# Patient Record
Sex: FEMALE | Race: WHITE | URBAN - METROPOLITAN AREA
[De-identification: names, ages, dates, MRNs, and addresses within clinical notes are randomized per-mention and may not be internally consistent; named-entity substitution may affect disease eponyms.]

---

## 2018-02-21 ENCOUNTER — HISTORICAL (OUTPATIENT)
Dept: ADMINISTRATIVE | Facility: HOSPITAL | Age: 33
End: 2018-02-21

## 2018-02-23 LAB — FINAL CULTURE: NO GROWTH

## 2018-05-29 ENCOUNTER — HISTORICAL (OUTPATIENT)
Dept: ADMINISTRATIVE | Facility: HOSPITAL | Age: 33
End: 2018-05-29

## 2018-06-01 ENCOUNTER — HISTORICAL (OUTPATIENT)
Dept: ADMINISTRATIVE | Facility: HOSPITAL | Age: 33
End: 2018-06-01

## 2018-07-11 ENCOUNTER — HISTORICAL (OUTPATIENT)
Dept: LAB | Facility: HOSPITAL | Age: 33
End: 2018-07-11

## 2018-07-13 LAB — FINAL CULTURE: NORMAL

## 2019-06-17 LAB
B-HCG SERPL QL: NEGATIVE
ERYTHROCYTE [DISTWIDTH] IN BLOOD BY AUTOMATED COUNT: 13.3 % (ref 11.5–17)
HCT VFR BLD AUTO: 38.4 % (ref 37–47)
HGB BLD-MCNC: 12.3 GM/DL (ref 12–16)
MCH RBC QN AUTO: 27.6 PG (ref 27–31)
MCHC RBC AUTO-ENTMCNC: 32 GM/DL (ref 33–36)
MCV RBC AUTO: 86.3 FL (ref 80–94)
PLATELET # BLD AUTO: 249 X10(3)/MCL (ref 130–400)
PMV BLD AUTO: 9.5 FL (ref 9.4–12.4)
RBC # BLD AUTO: 4.45 X10(6)/MCL (ref 4.2–5.4)
WBC # SPEC AUTO: 5.3 X10(3)/MCL (ref 4.5–11.5)

## 2019-06-18 ENCOUNTER — HISTORICAL (OUTPATIENT)
Dept: ADMINISTRATIVE | Facility: HOSPITAL | Age: 34
End: 2019-06-18

## 2022-04-07 ENCOUNTER — HISTORICAL (OUTPATIENT)
Dept: ADMINISTRATIVE | Facility: HOSPITAL | Age: 37
End: 2022-04-07

## 2022-04-24 VITALS
BODY MASS INDEX: 26.29 KG/M2 | SYSTOLIC BLOOD PRESSURE: 129 MMHG | WEIGHT: 163.56 LBS | HEIGHT: 66 IN | OXYGEN SATURATION: 100 % | DIASTOLIC BLOOD PRESSURE: 84 MMHG

## 2022-04-30 NOTE — OP NOTE
Patient:   Gay Curry            MRN: [id number]            FIN: [id number]               Age:   34 years     Sex:  Female     :  1985   Associated Diagnoses:   HGSIL on Pap smear of cervix; Cervical intraepithelial neoplasia III   Author:   Lexii James MD      Operative Note   Operative Information   Date/ Time:  2019 10:28:00.     Procedures Performed: conization of cervix.     Preoperative Diagnosis: HGSIL on Pap smear of cervix ([id number] R87.613), Cervical intraepithelial neoplasia III ([id number]-WH D06.9).     Postoperative Diagnosis: same as pre-op.     Surgeon: Lexii James MD.     Assistant.     Anesthesia: general.     Speciman Removed: cervical tissue.     Description of Procedure/Findings/    Complications: After operative consent had been obtained the pt was taken to the OR and placed in a supine position, she was placed under general anesthesia, she was placed in a dorsal lithotomy position and prepped and draped in a sterile manner, an in and out cath was done using sterile technique with return of clear urine, examination under anesthesia was normal, a weighted speculum was placed in to the vagina, the anterior lip of the cervix was identified and grasped with a single tooth tenaculum, stay sutures of #1 chromic were placed at the 3 and 9 o'clock positions, these were tagged and held, a pitressin solution was placed into the cervix, Lugol's solution was applied, the cervix was then scored with the scalpel and a cone-shaped piece of cervix was removed, it was tagged at the `12 o'clock position with #2-0 silk, electrocautery was done to obtain hemostasis, stay sutures were cut, the single-tooth tenaculum was removed and the weighted speculum was removed, electrocautery  was used for hemostasis, the pt was taken down from the dorsal lithotomy position, replaced in to the supine position, she was awakened and extubated and taken to the recovery room in stable condition..      Esimated blood loss: loss less than  50  cc.     Complications: None.